# Patient Record
Sex: MALE | Race: OTHER | HISPANIC OR LATINO
[De-identification: names, ages, dates, MRNs, and addresses within clinical notes are randomized per-mention and may not be internally consistent; named-entity substitution may affect disease eponyms.]

---

## 2017-11-21 ENCOUNTER — APPOINTMENT (OUTPATIENT)
Dept: PULMONOLOGY | Facility: CLINIC | Age: 82
End: 2017-11-21
Payer: MEDICARE

## 2017-11-21 VITALS
SYSTOLIC BLOOD PRESSURE: 160 MMHG | HEART RATE: 74 BPM | DIASTOLIC BLOOD PRESSURE: 70 MMHG | WEIGHT: 139 LBS | OXYGEN SATURATION: 98 % | BODY MASS INDEX: 19.9 KG/M2 | HEIGHT: 70 IN

## 2017-11-21 VITALS — BODY MASS INDEX: 21.13 KG/M2 | HEIGHT: 68 IN

## 2017-11-21 DIAGNOSIS — Z87.891 PERSONAL HISTORY OF NICOTINE DEPENDENCE: ICD-10-CM

## 2017-11-21 DIAGNOSIS — Z86.39 PERSONAL HISTORY OF OTHER ENDOCRINE, NUTRITIONAL AND METABOLIC DISEASE: ICD-10-CM

## 2017-11-21 DIAGNOSIS — G60.9 HEREDITARY AND IDIOPATHIC NEUROPATHY, UNSPECIFIED: ICD-10-CM

## 2017-11-21 DIAGNOSIS — K21.9 GASTRO-ESOPHAGEAL REFLUX DISEASE W/OUT ESOPHAGITIS: ICD-10-CM

## 2017-11-21 DIAGNOSIS — Z00.00 ENCOUNTER FOR GENERAL ADULT MEDICAL EXAMINATION W/OUT ABNORMAL FINDINGS: ICD-10-CM

## 2017-11-21 DIAGNOSIS — Z85.46 PERSONAL HISTORY OF MALIGNANT NEOPLASM OF PROSTATE: ICD-10-CM

## 2017-11-21 DIAGNOSIS — Z86.79 PERSONAL HISTORY OF OTHER DISEASES OF THE CIRCULATORY SYSTEM: ICD-10-CM

## 2017-11-21 DIAGNOSIS — Z82.49 FAMILY HISTORY OF ISCHEMIC HEART DISEASE AND OTHER DISEASES OF THE CIRCULATORY SYSTEM: ICD-10-CM

## 2017-11-21 DIAGNOSIS — Z82.5 FAMILY HISTORY OF ASTHMA AND OTHER CHRONIC LOWER RESPIRATORY DISEASES: ICD-10-CM

## 2017-11-21 PROCEDURE — 94729 DIFFUSING CAPACITY: CPT

## 2017-11-21 PROCEDURE — 85018 HEMOGLOBIN: CPT | Mod: QW

## 2017-11-21 PROCEDURE — 94010 BREATHING CAPACITY TEST: CPT

## 2017-11-21 PROCEDURE — 99205 OFFICE O/P NEW HI 60 MIN: CPT | Mod: 25

## 2017-11-21 PROCEDURE — 94727 GAS DIL/WSHOT DETER LNG VOL: CPT

## 2017-11-21 RX ORDER — LOVASTATIN 40 MG/1
40 TABLET ORAL
Refills: 0 | Status: ACTIVE | COMMUNITY

## 2017-11-21 RX ORDER — OMEPRAZOLE 40 MG/1
40 CAPSULE, DELAYED RELEASE ORAL
Refills: 0 | Status: ACTIVE | COMMUNITY

## 2017-11-21 RX ORDER — GLIPIZIDE 2.5 MG/1
2.5 TABLET, FILM COATED, EXTENDED RELEASE ORAL
Refills: 0 | Status: ACTIVE | COMMUNITY

## 2018-05-14 ENCOUNTER — APPOINTMENT (OUTPATIENT)
Dept: PULMONOLOGY | Facility: CLINIC | Age: 83
End: 2018-05-14
Payer: MEDICARE

## 2018-05-14 VITALS — WEIGHT: 140 LBS | BODY MASS INDEX: 21.29 KG/M2

## 2018-05-14 VITALS — HEART RATE: 65 BPM | OXYGEN SATURATION: 98 % | SYSTOLIC BLOOD PRESSURE: 124 MMHG | DIASTOLIC BLOOD PRESSURE: 62 MMHG

## 2018-05-14 PROCEDURE — 85018 HEMOGLOBIN: CPT | Mod: QW

## 2018-05-14 PROCEDURE — 94727 GAS DIL/WSHOT DETER LNG VOL: CPT

## 2018-05-14 PROCEDURE — 94729 DIFFUSING CAPACITY: CPT

## 2018-05-14 PROCEDURE — 94010 BREATHING CAPACITY TEST: CPT

## 2018-05-14 PROCEDURE — 99214 OFFICE O/P EST MOD 30 MIN: CPT | Mod: 25

## 2018-05-14 RX ORDER — LATANOPROST/PF 0.005 %
0.01 DROPS OPHTHALMIC (EYE)
Qty: 8 | Refills: 0 | Status: ACTIVE | COMMUNITY
Start: 2017-12-27

## 2018-05-14 RX ORDER — BLOOD SUGAR DIAGNOSTIC
STRIP MISCELLANEOUS
Qty: 200 | Refills: 0 | Status: DISCONTINUED | COMMUNITY
Start: 2017-12-07

## 2018-05-14 RX ORDER — GABAPENTIN 600 MG/1
600 TABLET, COATED ORAL
Qty: 270 | Refills: 0 | Status: ACTIVE | COMMUNITY
Start: 2018-01-03

## 2018-05-14 RX ORDER — LANCETS 28 GAUGE
EACH MISCELLANEOUS
Qty: 200 | Refills: 0 | Status: DISCONTINUED | COMMUNITY
Start: 2017-12-07

## 2018-05-14 RX ORDER — ALCOHOL ANTISEPTIC PADS
70 PADS, MEDICATED (EA) TOPICAL
Qty: 200 | Refills: 0 | Status: DISCONTINUED | COMMUNITY
Start: 2018-04-04

## 2018-05-14 RX ORDER — GABAPENTIN 300 MG/1
300 CAPSULE ORAL
Refills: 0 | Status: DISCONTINUED | COMMUNITY
End: 2018-05-14

## 2018-05-14 RX ORDER — MECLIZINE HYDROCHLORIDE 25 MG/1
25 TABLET ORAL
Refills: 0 | Status: DISCONTINUED | COMMUNITY
End: 2018-05-14

## 2018-05-14 RX ORDER — MECLIZINE HYDROCHLORIDE 12.5 MG/1
12.5 TABLET ORAL
Qty: 270 | Refills: 0 | Status: ACTIVE | COMMUNITY
Start: 2018-01-03

## 2018-07-11 ENCOUNTER — APPOINTMENT (OUTPATIENT)
Dept: RHEUMATOLOGY | Facility: CLINIC | Age: 83
End: 2018-07-11
Payer: MEDICARE

## 2018-07-11 VITALS
TEMPERATURE: 98 F | SYSTOLIC BLOOD PRESSURE: 128 MMHG | OXYGEN SATURATION: 97 % | HEIGHT: 68 IN | WEIGHT: 140 LBS | DIASTOLIC BLOOD PRESSURE: 52 MMHG | RESPIRATION RATE: 17 BRPM | HEART RATE: 61 BPM | BODY MASS INDEX: 21.22 KG/M2

## 2018-07-11 DIAGNOSIS — Z87.891 PERSONAL HISTORY OF NICOTINE DEPENDENCE: ICD-10-CM

## 2018-07-11 DIAGNOSIS — R76.8 OTHER SPECIFIED ABNORMAL IMMUNOLOGICAL FINDINGS IN SERUM: ICD-10-CM

## 2018-07-11 DIAGNOSIS — I10 ESSENTIAL (PRIMARY) HYPERTENSION: ICD-10-CM

## 2018-07-11 DIAGNOSIS — Z82.61 FAMILY HISTORY OF ARTHRITIS: ICD-10-CM

## 2018-07-11 DIAGNOSIS — R74.8 ABNORMAL LEVELS OF OTHER SERUM ENZYMES: ICD-10-CM

## 2018-07-11 DIAGNOSIS — Z86.39 PERSONAL HISTORY OF OTHER ENDOCRINE, NUTRITIONAL AND METABOLIC DISEASE: ICD-10-CM

## 2018-07-11 DIAGNOSIS — Z85.46 PERSONAL HISTORY OF MALIGNANT NEOPLASM OF PROSTATE: ICD-10-CM

## 2018-07-11 PROCEDURE — 99204 OFFICE O/P NEW MOD 45 MIN: CPT

## 2018-11-27 ENCOUNTER — APPOINTMENT (OUTPATIENT)
Dept: PULMONOLOGY | Facility: CLINIC | Age: 83
End: 2018-11-27

## 2019-06-25 ENCOUNTER — APPOINTMENT (OUTPATIENT)
Dept: PULMONOLOGY | Facility: CLINIC | Age: 84
End: 2019-06-25
Payer: MEDICARE

## 2019-06-25 VITALS
DIASTOLIC BLOOD PRESSURE: 70 MMHG | WEIGHT: 139 LBS | OXYGEN SATURATION: 95 % | HEART RATE: 72 BPM | SYSTOLIC BLOOD PRESSURE: 140 MMHG | BODY MASS INDEX: 21.13 KG/M2

## 2019-06-25 VITALS — BODY MASS INDEX: 21.77 KG/M2 | HEIGHT: 67 IN

## 2019-06-25 PROCEDURE — 85018 HEMOGLOBIN: CPT | Mod: QW

## 2019-06-25 PROCEDURE — 99214 OFFICE O/P EST MOD 30 MIN: CPT | Mod: 25

## 2019-06-25 PROCEDURE — 94010 BREATHING CAPACITY TEST: CPT

## 2019-06-25 NOTE — PHYSICAL EXAM
[General Appearance - Well Developed] : well developed [Normal Appearance] : normal appearance [No Deformities] : no deformities [General Appearance - Well Nourished] : well nourished [Well Groomed] : well groomed [General Appearance - In No Acute Distress] : no acute distress [Normal Conjunctiva] : the conjunctiva exhibited no abnormalities [Normal Oropharynx] : normal oropharynx [Eyelids - No Xanthelasma] : the eyelids demonstrated no xanthelasmas [Neck Appearance] : the appearance of the neck was normal [Neck Cervical Mass (___cm)] : no neck mass was observed [Thyroid Nodule] : there were no palpable thyroid nodules [Jugular Venous Distention Increased] : there was no jugular-venous distention [Thyroid Diffuse Enlargement] : the thyroid was not enlarged [Heart Sounds] : normal S1 and S2 [Heart Rate And Rhythm] : heart rate and rhythm were normal [Murmurs] : no murmurs present [Respiration, Rhythm And Depth] : normal respiratory rhythm and effort [Exaggerated Use Of Accessory Muscles For Inspiration] : no accessory muscle use [Abdomen Tenderness] : non-tender [Abdomen Soft] : soft [Abnormal Walk] : normal gait [Abdomen Mass (___ Cm)] : no abdominal mass palpated [Nail Clubbing] : no clubbing of the fingernails [Cyanosis, Localized] : no localized cyanosis [Gait - Sufficient For Exercise Testing] : the gait was sufficient for exercise testing [Petechial Hemorrhages (___cm)] : no petechial hemorrhages [No Venous Stasis] : no venous stasis [Skin Lesions] : no skin lesions [No Xanthoma] : no  xanthoma was observed [No Skin Ulcers] : no skin ulcer [Deep Tendon Reflexes (DTR)] : deep tendon reflexes were 2+ and symmetric [Sensation] : the sensory exam was normal to light touch and pinprick [No Focal Deficits] : no focal deficits [Skin Color & Pigmentation] : normal skin color and pigmentation [] : no rash [Skin Turgor] : normal skin turgor [FreeTextEntry1] : bilateral posterior dependent rales

## 2019-06-25 NOTE — HISTORY OF PRESENT ILLNESS
[FreeTextEntry1] : 86-year-old male with a 30-pack-year history of cigarette smoking DC'd 36 years ago, seen today in followup for mild interstitial lung disease. 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 He currently denies any complaints of cough, wheeze, shortness of breath, masses, weight loss, rashes, myalgias or arthralgias. He continues to have mild ataxia.

## 2019-06-25 NOTE — PROCEDURE
[FreeTextEntry1] : \par \par PFT'S Normal spirometry. unable to perform lung volumes due to technique

## 2019-06-25 NOTE — DISCUSSION/SUMMARY
[FreeTextEntry1] : 86-year-old male, seen today for the above. 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 No evidence of progression. A followup CAT scan will be performed. The patient will be contacted with results.

## 2020-08-27 ENCOUNTER — APPOINTMENT (OUTPATIENT)
Dept: PULMONOLOGY | Facility: CLINIC | Age: 85
End: 2020-08-27

## 2020-08-27 ENCOUNTER — APPOINTMENT (OUTPATIENT)
Dept: PULMONOLOGY | Facility: CLINIC | Age: 85
End: 2020-08-27
Payer: MEDICARE

## 2020-08-27 VITALS
WEIGHT: 132 LBS | OXYGEN SATURATION: 94 % | DIASTOLIC BLOOD PRESSURE: 78 MMHG | BODY MASS INDEX: 20.67 KG/M2 | HEART RATE: 88 BPM | SYSTOLIC BLOOD PRESSURE: 138 MMHG

## 2020-08-27 PROCEDURE — 99214 OFFICE O/P EST MOD 30 MIN: CPT

## 2020-11-05 ENCOUNTER — APPOINTMENT (OUTPATIENT)
Dept: PULMONOLOGY | Facility: CLINIC | Age: 85
End: 2020-11-05
Payer: MEDICARE

## 2020-11-05 ENCOUNTER — APPOINTMENT (OUTPATIENT)
Dept: PULMONOLOGY | Facility: CLINIC | Age: 85
End: 2020-11-05

## 2020-11-05 VITALS
BODY MASS INDEX: 20.4 KG/M2 | HEIGHT: 67 IN | SYSTOLIC BLOOD PRESSURE: 130 MMHG | DIASTOLIC BLOOD PRESSURE: 80 MMHG | OXYGEN SATURATION: 93 % | WEIGHT: 130 LBS | HEART RATE: 76 BPM

## 2020-11-05 PROCEDURE — 99072 ADDL SUPL MATRL&STAF TM PHE: CPT

## 2020-11-05 PROCEDURE — 99215 OFFICE O/P EST HI 40 MIN: CPT

## 2020-11-05 NOTE — DISCUSSION/SUMMARY
[Moderate] : moderate in severity [Pulmonary Fibrosis] : pulmonary fibrosis [Hypersensitivity Pneumonitis] : hypersensitivity pneumonitis [None] : There are no changes in medication management [Patient] : discussed with the patient [de-identified] : Fatou discussed OFEV with family. Will await f/u CCT

## 2020-11-05 NOTE — CONSULT LETTER
[Dear  ___] : Dear  [unfilled], [Consult Closing:] : Thank you very much for allowing me to participate in the care of this patient.  If you have any questions, please do not hesitate to contact me. [Sincerely,] : Sincerely, [DrEarl  ___] : Dr. FUNES [FreeTextEntry3] : Ferny Taylor MD FCCP\par Pulmonary/Critical Care/Sleep Medicine\par Department of Internal Medicine\par \par [facility] [employer]\par

## 2020-11-05 NOTE — HISTORY OF PRESENT ILLNESS
[Never] : never [TextBox_4] : 87-year-old male with a history of interstitial pulmonary fibrosis of unknown etiology. Patient has had increased shortness of breath without complaints of cough, wheeze, or sputum production. His dyspnea only occurs with activity. History is negative for leg edema, paroxysmal nocturnal dyspnea, orthopnea. Please note that his ILD was first diagnosed 18 years ago\par \par Patient has undergone flu vaccine, as well as pneumonia vaccines. Daughter does not recall dates and will bring them back on her next visit

## 2020-11-05 NOTE — PHYSICAL EXAM
[No Acute Distress] : no acute distress [Normal Oropharynx] : normal oropharynx [Normal Appearance] : normal appearance [No Neck Mass] : no neck mass [Normal Rate/Rhythm] : normal rate/rhythm [Normal S1, S2] : normal s1, s2 [No Murmurs] : no murmurs [No Abnormalities] : no abnormalities [Benign] : benign [Normal Gait] : normal gait [No Clubbing] : no clubbing [No Cyanosis] : no cyanosis [No Edema] : no edema [FROM] : FROM [Normal Color/ Pigmentation] : normal color/ pigmentation [No Focal Deficits] : no focal deficits [Oriented x3] : oriented x3 [Normal Affect] : normal affect [TextBox_68] : bret

## 2020-11-05 NOTE — HISTORY OF PRESENT ILLNESS
[Dyspnea on Exertion] : dyspnea on exertion [Follow-Up - Routine Clinic] : a routine clinic follow-up of [___ Year(s) Ago] : [unfilled] year(s) ago [None] : There are no known disease complications [Unchanged] : unchanged [Dry Cough] : no dry cough [Productive Cough] : no productive cough [Wheezing] : no wheezing [Hemoptysis] : no hemoptysis [Chest Pain] : no chest pain [Fever] : no fever [Fatigue] : no fatigue [Weight Loss] : no weight loss

## 2020-11-05 NOTE — CONSULT LETTER
[Dear  ___] : Dear  [unfilled], [Consult Closing:] : Thank you very much for allowing me to participate in the care of this patient.  If you have any questions, please do not hesitate to contact me. [Sincerely,] : Sincerely, [FreeTextEntry3] : Ferny Taylor MD FCCP\par Pulmonary/Critical Care/Sleep Medicine\par Department of Internal Medicine\par \par [facility]\par

## 2020-11-05 NOTE — PHYSICAL EXAM
[Normal Oropharynx] : normal oropharynx [No Acute Distress] : no acute distress [Normal Rate/Rhythm] : normal rate/rhythm [Normal Appearance] : normal appearance [No Neck Mass] : no neck mass [Normal S1, S2] : normal s1, s2 [No Murmurs] : no murmurs [No Abnormalities] : no abnormalities [Benign] : benign [Normal Gait] : normal gait [No Clubbing] : no clubbing [No Cyanosis] : no cyanosis [No Edema] : no edema [FROM] : FROM [Normal Affect] : normal affect [Oriented x3] : oriented x3 [No Focal Deficits] : no focal deficits [Normal Color/ Pigmentation] : normal color/ pigmentation [TextBox_68] : bret

## 2020-11-05 NOTE — DISCUSSION/SUMMARY
[FreeTextEntry1] : 87-year-old male with a history of pulmonary fibrosis with recent deterioration seen today in followup. Patient appears to have progressive disease and a slight is being started on OFEV. Oxygen therapy is also being initiated with activity and sleep

## 2020-12-31 DIAGNOSIS — Z01.818 ENCOUNTER FOR OTHER PREPROCEDURAL EXAMINATION: ICD-10-CM

## 2021-01-07 ENCOUNTER — APPOINTMENT (OUTPATIENT)
Dept: DISASTER EMERGENCY | Facility: CLINIC | Age: 86
End: 2021-01-07

## 2021-01-09 LAB — SARS-COV-2 N GENE NPH QL NAA+PROBE: NOT DETECTED

## 2021-01-11 ENCOUNTER — APPOINTMENT (OUTPATIENT)
Dept: PULMONOLOGY | Facility: CLINIC | Age: 86
End: 2021-01-11
Payer: MEDICARE

## 2021-01-11 VITALS — HEART RATE: 79 BPM | DIASTOLIC BLOOD PRESSURE: 80 MMHG | SYSTOLIC BLOOD PRESSURE: 140 MMHG | OXYGEN SATURATION: 93 %

## 2021-01-11 VITALS — TEMPERATURE: 98 F | WEIGHT: 129 LBS | BODY MASS INDEX: 20.25 KG/M2 | HEIGHT: 67 IN

## 2021-01-11 PROCEDURE — 94010 BREATHING CAPACITY TEST: CPT

## 2021-01-11 PROCEDURE — 85018 HEMOGLOBIN: CPT | Mod: QW

## 2021-01-11 PROCEDURE — 99072 ADDL SUPL MATRL&STAF TM PHE: CPT

## 2021-01-11 PROCEDURE — 99214 OFFICE O/P EST MOD 30 MIN: CPT | Mod: 25

## 2021-01-11 PROCEDURE — 94727 GAS DIL/WSHOT DETER LNG VOL: CPT

## 2021-01-11 RX ORDER — IBUPROFEN 800 MG/1
800 TABLET, FILM COATED ORAL
Qty: 20 | Refills: 0 | Status: DISCONTINUED | COMMUNITY
Start: 2020-12-09

## 2021-01-11 RX ORDER — LOSARTAN POTASSIUM 25 MG/1
25 TABLET, FILM COATED ORAL
Qty: 180 | Refills: 0 | Status: DISCONTINUED | COMMUNITY
Start: 2020-09-16

## 2021-01-11 RX ORDER — AMOXICILLIN 500 MG/1
500 CAPSULE ORAL
Qty: 30 | Refills: 0 | Status: DISCONTINUED | COMMUNITY
Start: 2020-12-09

## 2021-01-11 NOTE — HISTORY OF PRESENT ILLNESS
[TextBox_4] : 87-year-old male with a history of idiopathic pulmonary fibrosis seen today in followup. Patient was first diagnosed approximately 18 years ago without intervention until recently. Due to increasing complaints of shortness of breath. Patient was started on Nintedanib 150mg BID on last visit. Did not continue due to GI complaints. Feeling back to baseline. No c/o cough wheeze or sputum. One episode of  BBBPR now resolved. He has sufferred 10 pound wt loss with above.\par \par Currently he denies any complaints of cough, wheeze, or sputum. He is using his oxygen with exercise and sleep

## 2021-01-11 NOTE — CONSULT LETTER
[Dear  ___] : Dear  [unfilled], [Consult Closing:] : Thank you very much for allowing me to participate in the care of this patient.  If you have any questions, please do not hesitate to contact me. [Sincerely,] : Sincerely, [FreeTextEntry3] : Ferny Taylor MD FCCP\par Pulmonary/Critical Care/Sleep Medicine\par Department of Internal Medicine\par \par [facility]\par

## 2021-01-11 NOTE — DISCUSSION/SUMMARY
[FreeTextEntry1] : 87-year-old male, history of pulmonary fibrosis seen today status post recent deterioration. Patient appears to be clinically at baseline following an aggressive reaction to OFEV. He is undergoing a GI workup for bright red blood per rectum, but this does not appear to have occurred. He has been compliant with oxygen and is benefiting from the same

## 2021-02-03 RX ORDER — NINTEDANIB 150 MG/1
150 CAPSULE ORAL TWICE DAILY
Qty: 60 | Refills: 5 | Status: COMPLETED | OUTPATIENT
Start: 2020-11-16 | End: 2021-02-03

## 2021-03-02 ENCOUNTER — NON-APPOINTMENT (OUTPATIENT)
Age: 86
End: 2021-03-02

## 2021-05-07 ENCOUNTER — APPOINTMENT (OUTPATIENT)
Dept: DISASTER EMERGENCY | Facility: CLINIC | Age: 86
End: 2021-05-07

## 2021-05-08 LAB — SARS-COV-2 N GENE NPH QL NAA+PROBE: NOT DETECTED

## 2021-05-11 ENCOUNTER — APPOINTMENT (OUTPATIENT)
Dept: PULMONOLOGY | Facility: CLINIC | Age: 86
End: 2021-05-11
Payer: MEDICARE

## 2021-05-11 VITALS
OXYGEN SATURATION: 96 % | DIASTOLIC BLOOD PRESSURE: 72 MMHG | HEART RATE: 74 BPM | SYSTOLIC BLOOD PRESSURE: 130 MMHG | RESPIRATION RATE: 16 BRPM

## 2021-05-11 VITALS — BODY MASS INDEX: 20.73 KG/M2 | HEIGHT: 66 IN | WEIGHT: 129 LBS | TEMPERATURE: 98 F

## 2021-05-11 PROCEDURE — 94727 GAS DIL/WSHOT DETER LNG VOL: CPT

## 2021-05-11 PROCEDURE — 94010 BREATHING CAPACITY TEST: CPT

## 2021-05-11 PROCEDURE — 94729 DIFFUSING CAPACITY: CPT

## 2021-05-11 PROCEDURE — 99072 ADDL SUPL MATRL&STAF TM PHE: CPT

## 2021-05-11 PROCEDURE — 99214 OFFICE O/P EST MOD 30 MIN: CPT | Mod: 25

## 2021-05-11 PROCEDURE — 85018 HEMOGLOBIN: CPT | Mod: QW

## 2021-05-11 NOTE — HISTORY OF PRESENT ILLNESS
[Follow-Up - Routine Clinic] : a routine clinic follow-up of [Dyspnea on Exertion] : dyspnea on exertion [Dyspnea at Rest] : no dyspnea at rest [TextBox_4] : 88-year-old male, nonsmoker, seen today for followup of his idiopathic pulmonary fibrosis. This was diagnosed nearly 19 years ago without intervention. He has been intolerant of OFEV and somewhat poorly compliant with oxygen with exercise. He does suffer from significant ataxia limiting his ability to ambulate. He denies any complaints of cough, wheeze, sputum or worsening exercise tolerance. [ESS] : 0

## 2021-05-11 NOTE — CONSULT LETTER
[Dear  ___] : Dear  [unfilled], [Consult Closing:] : Thank you very much for allowing me to participate in the care of this patient.  If you have any questions, please do not hesitate to contact me. [Sincerely,] : Sincerely, [FreeTextEntry3] : Ferny Taylor MD FCCP\par Pulmonary/Critical Care/Sleep Medicine\par Department of Internal Medicine\par \par [facility]\par  Walk in

## 2021-05-11 NOTE — PROCEDURE
[FreeTextEntry1] : Pulmonary function test demonstrated mild degree of restriction without change from baseline

## 2021-05-11 NOTE — DISCUSSION/SUMMARY
[FreeTextEntry1] : 88-year-old male with a long history of poor fibrosis without significant progression. No indication for institution of other anti-fibrotic medication. Patient advised to improve his compliance with oxygen

## 2021-11-08 ENCOUNTER — APPOINTMENT (OUTPATIENT)
Dept: DISASTER EMERGENCY | Facility: CLINIC | Age: 86
End: 2021-11-08

## 2021-11-09 LAB — SARS-COV-2 N GENE NPH QL NAA+PROBE: NOT DETECTED

## 2021-11-11 ENCOUNTER — APPOINTMENT (OUTPATIENT)
Dept: PULMONOLOGY | Facility: CLINIC | Age: 86
End: 2021-11-11
Payer: MEDICARE

## 2021-11-11 VITALS — SYSTOLIC BLOOD PRESSURE: 137 MMHG | HEART RATE: 80 BPM | DIASTOLIC BLOOD PRESSURE: 66 MMHG | OXYGEN SATURATION: 93 %

## 2021-11-11 VITALS — WEIGHT: 130 LBS | HEIGHT: 66 IN | BODY MASS INDEX: 20.89 KG/M2

## 2021-11-11 DIAGNOSIS — R93.89 ABNORMAL FINDINGS ON DIAGNOSTIC IMAGING OF OTHER SPECIFIED BODY STRUCTURES: ICD-10-CM

## 2021-11-11 PROCEDURE — 99214 OFFICE O/P EST MOD 30 MIN: CPT

## 2021-11-11 RX ORDER — DORZOLAMIDE HYDROCHLORIDE TIMOLOL MALEATE 20; 5 MG/ML; MG/ML
22.3-6.8 SOLUTION/ DROPS OPHTHALMIC
Qty: 10 | Refills: 0 | Status: DISCONTINUED | COMMUNITY
Start: 2018-05-02 | End: 2021-11-11

## 2021-11-11 RX ORDER — LOSARTAN POTASSIUM 50 MG/1
50 TABLET, FILM COATED ORAL
Refills: 0 | Status: DISCONTINUED | COMMUNITY
End: 2021-11-11

## 2021-11-11 NOTE — CONSULT LETTER
[Dear  ___] : Dear  [unfilled], [Consult Closing:] : Thank you very much for allowing me to participate in the care of this patient.  If you have any questions, please do not hesitate to contact me. [Sincerely,] : Sincerely, [FreeTextEntry3] : Ferny Taylor MD FCCP\par Pulmonary/Critical Care/Sleep Medicine\par Department of Internal Medicine\par \par [facility]\par

## 2021-11-11 NOTE — HISTORY OF PRESENT ILLNESS
[Follow-Up - Routine Clinic] : a routine clinic follow-up of [Dyspnea on Exertion] : dyspnea on exertion [TextBox_4] : 88-year-old male, nonsmoker, seen today for followup of his idiopathic pulmonary fibrosis. This was diagnosed nearly 19 years ago without intervention. He has been intolerant of OFEV and somewhat poorly compliant with oxygen with exercise. He does suffer from significant ataxia limiting his ability to ambulate. He denies any complaints of cough, wheeze, sputum or worsening exercise tolerance. [ESS] : 0 [Dyspnea at Rest] : no dyspnea at rest

## 2021-11-11 NOTE — DISCUSSION/SUMMARY
[FreeTextEntry1] : 88-year-old male with a long history of pulmonary fibrosis without significant progression. No indication for institution of other anti-fibrotic medications. Patient advised to improve his compliance with oxygen

## 2021-11-11 NOTE — PROCEDURE
[FreeTextEntry1] : Pulmonary function test demonstrated mild degree of restriction without change from baseline. Diffusion capacity poorly preformed

## 2021-12-13 ENCOUNTER — APPOINTMENT (OUTPATIENT)
Dept: CT IMAGING | Facility: CLINIC | Age: 86
End: 2021-12-13

## 2022-05-12 ENCOUNTER — APPOINTMENT (OUTPATIENT)
Dept: DISASTER EMERGENCY | Facility: CLINIC | Age: 87
End: 2022-05-12

## 2022-05-13 ENCOUNTER — OUTPATIENT (OUTPATIENT)
Dept: OUTPATIENT SERVICES | Facility: HOSPITAL | Age: 87
LOS: 1 days | End: 2022-05-13
Payer: MEDICARE

## 2022-05-13 ENCOUNTER — APPOINTMENT (OUTPATIENT)
Dept: CT IMAGING | Facility: CLINIC | Age: 87
End: 2022-05-13
Payer: MEDICARE

## 2022-05-13 DIAGNOSIS — J84.9 INTERSTITIAL PULMONARY DISEASE, UNSPECIFIED: ICD-10-CM

## 2022-05-13 PROCEDURE — 71250 CT THORAX DX C-: CPT

## 2022-05-13 PROCEDURE — 71250 CT THORAX DX C-: CPT | Mod: 26

## 2022-05-16 ENCOUNTER — APPOINTMENT (OUTPATIENT)
Dept: PULMONOLOGY | Facility: CLINIC | Age: 87
End: 2022-05-16
Payer: MEDICARE

## 2022-05-16 VITALS — WEIGHT: 130 LBS | BODY MASS INDEX: 20.65 KG/M2 | HEIGHT: 66.5 IN

## 2022-05-16 PROCEDURE — 94727 GAS DIL/WSHOT DETER LNG VOL: CPT

## 2022-05-16 PROCEDURE — 85018 HEMOGLOBIN: CPT | Mod: QW

## 2022-05-16 PROCEDURE — 94010 BREATHING CAPACITY TEST: CPT

## 2022-05-16 PROCEDURE — 94729 DIFFUSING CAPACITY: CPT

## 2022-05-18 ENCOUNTER — NON-APPOINTMENT (OUTPATIENT)
Age: 87
End: 2022-05-18

## 2022-06-23 ENCOUNTER — APPOINTMENT (OUTPATIENT)
Dept: PULMONOLOGY | Facility: CLINIC | Age: 87
End: 2022-06-23
Payer: MEDICARE

## 2022-06-23 VITALS
DIASTOLIC BLOOD PRESSURE: 78 MMHG | OXYGEN SATURATION: 95 % | HEIGHT: 69 IN | BODY MASS INDEX: 20.44 KG/M2 | SYSTOLIC BLOOD PRESSURE: 122 MMHG | HEART RATE: 85 BPM | WEIGHT: 138 LBS

## 2022-06-23 PROCEDURE — 99215 OFFICE O/P EST HI 40 MIN: CPT

## 2022-06-23 NOTE — HISTORY OF PRESENT ILLNESS
[Follow-Up - Routine Clinic] : a routine clinic follow-up of [Dyspnea on Exertion] : dyspnea on exertion [None] : The patient is not currently being treated for this problem [Intermittent] : Intermittent [NC] : Nasal Cannula [PRN] : As needed [Former] : former [TextBox_4] : 89-year-old male, nonsmoker, seen today for followup of his idiopathic pulmonary fibrosis. This was diagnosed nearly  years ago without intervention. He has been intolerant of OFEV and somewhat poorly compliant with oxygen with exercise. He does suffer from significant ataxia limiting his ability to ambulate. He denies any complaints of cough, wheeze, sputum or worsening exercise tolerance. + chronic sinus congestion. Sees ENT [FreeTextEntry1] : 2 [FreeTextEntry4] : [facility] [ESS] : 0 [Dyspnea at Rest] : no dyspnea at rest

## 2022-06-23 NOTE — CONSULT LETTER
[Dear  ___] : Dear  [unfilled], [Consult Closing:] : Thank you very much for allowing me to participate in the care of this patient.  If you have any questions, please do not hesitate to contact me. [Sincerely,] : Sincerely, [FreeTextEntry3] : Ferny Taylor MD FCCP\par Pulmonary/Critical Care/Sleep Medicine\par Department of Internal Medicine\par \par [facility]\par

## 2022-06-23 NOTE — PROCEDURE
[FreeTextEntry1] : Pulmonary function test demonstrated mild degree of restriction without change from baseline.

## 2022-06-23 NOTE — END OF VISIT
[Time Spent: ___ minutes] : I have spent [unfilled] minutes of time on the encounter. [FreeTextEntry3] : Pacs review

## 2022-12-02 ENCOUNTER — APPOINTMENT (OUTPATIENT)
Dept: PULMONOLOGY | Facility: CLINIC | Age: 87
End: 2022-12-02

## 2022-12-02 VITALS
RESPIRATION RATE: 16 BRPM | DIASTOLIC BLOOD PRESSURE: 62 MMHG | HEART RATE: 80 BPM | OXYGEN SATURATION: 93 % | SYSTOLIC BLOOD PRESSURE: 140 MMHG

## 2022-12-02 VITALS — BODY MASS INDEX: 21.35 KG/M2 | WEIGHT: 136 LBS | HEIGHT: 67 IN

## 2022-12-02 DIAGNOSIS — J45.909 UNSPECIFIED ASTHMA, UNCOMPLICATED: ICD-10-CM

## 2022-12-02 PROCEDURE — 85018 HEMOGLOBIN: CPT | Mod: QW

## 2022-12-02 PROCEDURE — 94727 GAS DIL/WSHOT DETER LNG VOL: CPT

## 2022-12-02 PROCEDURE — 99214 OFFICE O/P EST MOD 30 MIN: CPT | Mod: 25

## 2022-12-02 PROCEDURE — 94010 BREATHING CAPACITY TEST: CPT

## 2022-12-02 RX ORDER — ALBUTEROL SULFATE 2.5 MG/3ML
(2.5 MG/3ML) SOLUTION RESPIRATORY (INHALATION)
Qty: 1 | Refills: 5 | Status: ACTIVE | COMMUNITY
Start: 2022-12-02 | End: 1900-01-01

## 2023-01-12 PROBLEM — J45.909 REACTIVE AIRWAY DISEASE: Status: ACTIVE | Noted: 2023-01-12

## 2023-01-12 NOTE — HISTORY OF PRESENT ILLNESS
[Intermittent] : Intermittent [NC] : Nasal Cannula [PRN] : As needed [TextBox_4] : 89-year-old male with a history of idiopathic pulmonary fibrosis diagnosed many years ago without intervention.  Patient has been intolerant of Ofev and remains also poorly compliant with his oxygen.  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  He denies any significant change in overall exercise tolerance.  He does complain of consistent chronic sinus disease as well as sputum but he has not begun using nasal saline rinse. [FreeTextEntry1] : 2 [FreeTextEntry4] : [facility]

## 2023-01-12 NOTE — DISCUSSION/SUMMARY
[FreeTextEntry1] : 89-year-old male seen today for idiopathic pulmonary fibrosis.  Patient remains clinically stable complicated by chronic complaints from sinusitis.  I have once again encouraged him with a discussion with his wife and daughter for the use of his saline sinus rinse.  Drug nebulization at least once a day with a flutter valve have also been instituted to improve pulmonary hygiene.  Overall prognosis remains poor.

## 2023-01-12 NOTE — PROCEDURE
[FreeTextEntry1] : 12/2/2022-full pulmonary function test were attempted but the patient could not perform the diffusion capacity-lung volumes and spirometry continue to demonstrate a moderate degree of restriction without significant change

## 2023-01-27 ENCOUNTER — APPOINTMENT (OUTPATIENT)
Dept: PULMONOLOGY | Facility: CLINIC | Age: 88
End: 2023-01-27
Payer: MEDICARE

## 2023-01-27 VITALS — SYSTOLIC BLOOD PRESSURE: 128 MMHG | DIASTOLIC BLOOD PRESSURE: 64 MMHG | HEART RATE: 70 BPM | OXYGEN SATURATION: 98 %

## 2023-01-27 PROCEDURE — 99215 OFFICE O/P EST HI 40 MIN: CPT

## 2023-01-27 RX ORDER — AMLODIPINE BESYLATE 5 MG/1
5 TABLET ORAL
Refills: 0 | Status: DISCONTINUED | COMMUNITY
End: 2023-01-27

## 2023-01-27 RX ORDER — IPRATROPIUM BROMIDE 21 UG/1
0.03 SPRAY NASAL
Refills: 0 | Status: ACTIVE | COMMUNITY

## 2023-01-27 NOTE — HISTORY OF PRESENT ILLNESS
[Intermittent] : Intermittent [NC] : Nasal Cannula [PRN] : As needed [TextBox_4] : 89-year-old male with a history of idiopathic pulmonary fibrosis times many years seen today in follow-up.  Patient previously intolerant of antifibrotic therapy.  History is positive for chronic ataxia with peripheral neuropathy on high-dose gabapentin.  Patient had been complaining of increased sputum production on his last visit and a flutter valve was suggested to his family which they not obtain.\par \par Patient was seen in [facility] emergency room on 1/25/2023.  Records are obtained as well as CAT scan results which are included below.  Patient presented with increased cough and feeling of choking.  Evaluation demonstrated normal CHEM profile without change in his level of hypoxemia.  He was treated with drug nebulization with improvement of symptoms and clear secretions.  Presently complains of increased shortness of breath progressive from baseline.  There is no history of fevers chills lightheadedness or dizziness. [FreeTextEntry1] : 2 [FreeTextEntry4] : [facility]

## 2023-01-27 NOTE — CONSULT LETTER
[Consult Closing:] : Thank you very much for allowing me to participate in the care of this patient.  If you have any questions, please do not hesitate to contact me. [Sincerely,] : Sincerely, [Dear  ___] : Dear  [unfilled], [FreeTextEntry3] : Ferny Taylor MD FCCP\par Pulmonary/Critical Care/Sleep Medicine\par Department of Internal Medicine\par \par [facility]\par

## 2023-01-27 NOTE — DISCUSSION/SUMMARY
[FreeTextEntry1] : 89 male seen today for follow-up of idiopathic pulmonary fibrosis.  Patient condition continues to deteriorate and most recently has suffered an exacerbation versus mucous plugging.  I have discussed with the daughter consideration for hospice in light of patient's age as well as extent of disease.\par \par Plan:\par 1.  Strongly suggest hospice\par 2.  Prednisone taper\par 3.  Obtain flutter valve to improve pulmonary hygiene\par 4.  Humidification of O2\par 5.  O2 extension for home concentrator to improve O2 compliance

## 2023-01-27 NOTE — PROCEDURE
normal... [FreeTextEntry1] : 12/2/2022-full pulmonary function test were attempted but the patient could not perform the diffusion capacity-lung volumes and spirometry continue to demonstrate a moderate degree of restriction without significant change

## 2023-03-28 ENCOUNTER — APPOINTMENT (OUTPATIENT)
Dept: PULMONOLOGY | Facility: CLINIC | Age: 88
End: 2023-03-28
Payer: MEDICARE

## 2023-03-28 VITALS
SYSTOLIC BLOOD PRESSURE: 122 MMHG | DIASTOLIC BLOOD PRESSURE: 60 MMHG | HEIGHT: 69 IN | WEIGHT: 124 LBS | RESPIRATION RATE: 16 BRPM | BODY MASS INDEX: 18.37 KG/M2

## 2023-03-28 DIAGNOSIS — Z99.81 HYPOXEMIA: ICD-10-CM

## 2023-03-28 DIAGNOSIS — R09.02 HYPOXEMIA: ICD-10-CM

## 2023-03-28 PROCEDURE — 99214 OFFICE O/P EST MOD 30 MIN: CPT

## 2023-03-28 RX ORDER — PREDNISONE 10 MG/1
10 TABLET ORAL
Qty: 42 | Refills: 0 | Status: DISCONTINUED | COMMUNITY
Start: 2023-01-27 | End: 2023-03-28

## 2023-03-28 RX ORDER — PREDNISONE 10 MG/1
10 TABLET ORAL DAILY
Qty: 100 | Refills: 5 | Status: ACTIVE | COMMUNITY
Start: 2023-03-28 | End: 1900-01-01

## 2023-03-28 NOTE — DISCUSSION/SUMMARY
[Pulmonary Fibrosis] : pulmonary fibrosis [Severe] : severe [Worsening] : worsening [Medication Changes Per Orders] : Medication changes are as documented in orders [Supplemental Oxygen] : supplemental oxygen [Patient] : discussed with the patient [de-identified] : May adjust oxygen as needed with exercise, flutter valve to improve pulmonary hygiene

## 2023-03-28 NOTE — HISTORY OF PRESENT ILLNESS
[Intermittent] : Intermittent [NC] : Nasal Cannula [PRN] : As needed [Follow-Up - Routine Clinic] : a routine clinic follow-up of [Idiopathic Pulmonary Fibrosis] : idiopathic pulmonary fibrosis [___ Month(s) Ago] : [unfilled] month(s) ago [Adding Medication ___] : adding [unfilled] [Dyspnea on Exertion] : dyspnea on exertion [Currently Experiencing] : The patient is currently experiencing symptoms. [___ Year(s) Ago] : [unfilled] year(s) ago [Worsening] : worsening [Unchanged] : unchanged [Hypoxia] : hypoxia [Occupational Irritant Exposure] : occupational irritant exposure [Inorganic Dust] : inorganic dust [Organic Dust] : organic dust [Dry Cough] : no dry cough [Productive Cough] : no productive cough [Chest Pain] : no chest pain [Fever] : no fever [Weight Loss] : no weight loss [Respiratory Failure] : no respiratory failure [Right-Sided Heart Failure] : no right-sided heart failure [de-identified] : refusing OFEV [TextBox_4] : Daughter states that he had a positive response to steroids.  She has noted hypoxemia with activity but there has been no adjustment in O2 with exercise [FreeTextEntry1] : 2 [FreeTextEntry4] : [employer]

## 2023-03-28 NOTE — CONSULT LETTER
[Consult Closing:] : Thank you very much for allowing me to participate in the care of this patient.  If you have any questions, please do not hesitate to contact me. [Sincerely,] : Sincerely, [Dear  ___] : Dear  [unfilled], [FreeTextEntry3] : Ferny Taylor MD FCCP\par Pulmonary/Critical Care/Sleep Medicine\par Department of Internal Medicine\par \par [facility]\par

## 2023-04-27 ENCOUNTER — OUTPATIENT (OUTPATIENT)
Dept: OUTPATIENT SERVICES | Facility: HOSPITAL | Age: 88
LOS: 1 days | End: 2023-04-27
Payer: MEDICARE

## 2023-04-27 DIAGNOSIS — R13.10 DYSPHAGIA, UNSPECIFIED: ICD-10-CM

## 2023-04-27 PROCEDURE — 74220 X-RAY XM ESOPHAGUS 1CNTRST: CPT | Mod: 26

## 2023-04-27 PROCEDURE — 74220 X-RAY XM ESOPHAGUS 1CNTRST: CPT

## 2023-05-03 ENCOUNTER — APPOINTMENT (OUTPATIENT)
Dept: PULMONOLOGY | Facility: CLINIC | Age: 88
End: 2023-05-03
Payer: MEDICARE

## 2023-05-03 DIAGNOSIS — J84.112 IDIOPATHIC PULMONARY FIBROSIS: ICD-10-CM

## 2023-05-03 PROCEDURE — 99443: CPT

## 2023-06-06 ENCOUNTER — APPOINTMENT (OUTPATIENT)
Dept: PULMONOLOGY | Facility: CLINIC | Age: 88
End: 2023-06-06